# Patient Record
Sex: FEMALE | Race: ASIAN | NOT HISPANIC OR LATINO
[De-identification: names, ages, dates, MRNs, and addresses within clinical notes are randomized per-mention and may not be internally consistent; named-entity substitution may affect disease eponyms.]

---

## 2019-10-29 NOTE — PHYSICAL EXAM
[General Appearance - Well Developed] : well developed [Normal Appearance] : normal appearance [Well Groomed] : well groomed [General Appearance - Well Nourished] : well nourished [No Deformities] : no deformities [General Appearance - In No Acute Distress] : no acute distress [Normal Conjunctiva] : the conjunctiva exhibited no abnormalities [Eyelids - No Xanthelasma] : the eyelids demonstrated no xanthelasmas [Normal Oral Mucosa] : normal oral mucosa [No Oral Pallor] : no oral pallor [No Oral Cyanosis] : no oral cyanosis [Normal Jugular Venous A Waves Present] : normal jugular venous A waves present [Normal Jugular Venous V Waves Present] : normal jugular venous V waves present [No Jugular Venous Richter A Waves] : no jugular venous richter A waves [Respiration, Rhythm And Depth] : normal respiratory rhythm and effort [Exaggerated Use Of Accessory Muscles For Inspiration] : no accessory muscle use [Auscultation Breath Sounds / Voice Sounds] : lungs were clear to auscultation bilaterally [Heart Rate And Rhythm] : heart rate and rhythm were normal [Heart Sounds] : normal S1 and S2 [Murmurs] : no murmurs present [Arterial Pulses Normal] : the arterial pulses were normal [Edema] : no peripheral edema present [Abdomen Soft] : soft [Abdomen Tenderness] : non-tender [Abdomen Mass (___ Cm)] : no abdominal mass palpated [Abnormal Walk] : normal gait [Nail Clubbing] : no clubbing of the fingernails [Cyanosis, Localized] : no localized cyanosis [Petechial Hemorrhages (___cm)] : no petechial hemorrhages [] : no ischemic changes [Oriented To Time, Place, And Person] : oriented to person, place, and time [Affect] : the affect was normal [Mood] : the mood was normal [No Anxiety] : not feeling anxious

## 2019-10-30 ENCOUNTER — APPOINTMENT (OUTPATIENT)
Dept: CARDIOLOGY | Facility: CLINIC | Age: 69
End: 2019-10-30
Payer: MEDICARE

## 2019-10-30 VITALS
WEIGHT: 165 LBS | RESPIRATION RATE: 18 BRPM | SYSTOLIC BLOOD PRESSURE: 130 MMHG | DIASTOLIC BLOOD PRESSURE: 80 MMHG | TEMPERATURE: 97.9 F | HEART RATE: 91 BPM | OXYGEN SATURATION: 97 % | BODY MASS INDEX: 27.88 KG/M2

## 2019-10-30 DIAGNOSIS — R07.9 CHEST PAIN, UNSPECIFIED: ICD-10-CM

## 2019-10-30 DIAGNOSIS — R06.02 SHORTNESS OF BREATH: ICD-10-CM

## 2019-10-30 PROCEDURE — 93015 CV STRESS TEST SUPVJ I&R: CPT

## 2019-10-30 PROCEDURE — 93306 TTE W/DOPPLER COMPLETE: CPT

## 2019-10-30 PROCEDURE — 99214 OFFICE O/P EST MOD 30 MIN: CPT | Mod: 25

## 2019-10-30 NOTE — REASON FOR VISIT
[Follow-Up - Clinic] : a clinic follow-up of [Abnormal ECG] : an abnormal ECG [Chest Pain] : chest pain [FreeTextEntry1] : fatigue

## 2019-10-30 NOTE — HISTORY OF PRESENT ILLNESS
[FreeTextEntry1] : Patient reports CP and SOB at night in bed lasting seconds as well as during daytime after meals, not from exertion. Patient reports that she has not been able to go out much due to leg issues. She is on Simvastatin 10 mg. \par \par 1) CP, SOB - I advised patient to undergo an echocardiogram and a treadmill stress test. \par \par 69-year-old female with HLD presents for followup.  Patient was last seen on 1/23/13 for evaluation of abnormal ECG, head fullness and lack of energy.  She underwent a stress echo and completed 3 minutes of Vic protocol. There was no ECG or echocardiographic evidence of ischemia.\par \par (1) Non-specific changes on ECG, chest discomfort - Her ECG abnormlaity likely represents an artifact. Her symptom is atypical for cardiac ischemia. She underwent a stress echo and completed 3 minutes of Vic protocol. There was no ECG or echocardiographic evidence of ischemia. Patient was reassured.\par \par (2) Fatigue, lack of energy - It may be related to her sleep disturbance but thyroid disorder should be ruled out.\par \par (3) Followup - as needed.\par \par Patient reports that for the past 2 months she has been experiencing head fullness, fatigue, and lack of energy.  She also complains of occasional chest discomfort which was not related to exertion.  She was found to have non-specific ST changes on ECG by Dr. Soria.  She denies any SOB with exertion.  She reports no palpitations or lightheadedness.  She denies history of syncope.  She has had trouble with her sleep for many years.  She also reports a history of right leg weakness of unclear etiology despite extensive neurologic workup.

## 2019-10-30 NOTE — DISCUSSION/SUMMARY
[FreeTextEntry1] : 62-year-old female with hyperlipidemia presents for evaluation of abnormal ECG, head fullness and lack of energy.  Patient reports that for the past 2 months she has been experiencing head fullness, fatigue, and lack of energy.  She also complains of occasional chest discomfort which was not related to exertion.  She was found to have non-specific ST changes on ECG by Dr. Soria.  She denies any SOB with exertion.  She reports no palpitations or lightheadedness.  She denies history of syncope.  She has had trouble with her sleep for many years.  She also reports a history of right leg weakness of unclear etiology despite extensive neurologic workup.\par \par (1) Non-specific changes on ECG, chest discomfort - Her ECG abnormlaity likely represents an artifact.  Her symptom is atypical for cardiac ischemia.  She underwent a stress echo and completed 3 minutes of Vic protocol. There was no ECG or echocardiographic evidence of ischemia. Patient was reassured.\par \par (2) Fatigue, lack of energy - It may be related to her sleep disturbance but thyroid disorder should be ruled out.\par \par (3) Followup - as needed.